# Patient Record
Sex: MALE | Race: WHITE | NOT HISPANIC OR LATINO
[De-identification: names, ages, dates, MRNs, and addresses within clinical notes are randomized per-mention and may not be internally consistent; named-entity substitution may affect disease eponyms.]

---

## 2021-09-05 ENCOUNTER — FORM ENCOUNTER (OUTPATIENT)
Age: 51
End: 2021-09-05

## 2021-09-08 ENCOUNTER — FORM ENCOUNTER (OUTPATIENT)
Age: 51
End: 2021-09-08

## 2021-09-30 ENCOUNTER — NON-APPOINTMENT (OUTPATIENT)
Age: 51
End: 2021-09-30

## 2021-09-30 ENCOUNTER — APPOINTMENT (OUTPATIENT)
Dept: ORTHOPEDIC SURGERY | Facility: CLINIC | Age: 51
End: 2021-09-30
Payer: COMMERCIAL

## 2021-09-30 VITALS
OXYGEN SATURATION: 95 % | HEIGHT: 75 IN | HEART RATE: 108 BPM | SYSTOLIC BLOOD PRESSURE: 133 MMHG | BODY MASS INDEX: 29.84 KG/M2 | WEIGHT: 240 LBS | DIASTOLIC BLOOD PRESSURE: 87 MMHG

## 2021-09-30 DIAGNOSIS — Z78.9 OTHER SPECIFIED HEALTH STATUS: ICD-10-CM

## 2021-09-30 DIAGNOSIS — Z82.49 FAMILY HISTORY OF ISCHEMIC HEART DISEASE AND OTHER DISEASES OF THE CIRCULATORY SYSTEM: ICD-10-CM

## 2021-09-30 DIAGNOSIS — Z87.09 PERSONAL HISTORY OF OTHER DISEASES OF THE RESPIRATORY SYSTEM: ICD-10-CM

## 2021-09-30 DIAGNOSIS — Z86.39 PERSONAL HISTORY OF OTHER ENDOCRINE, NUTRITIONAL AND METABOLIC DISEASE: ICD-10-CM

## 2021-09-30 DIAGNOSIS — Z86.79 PERSONAL HISTORY OF OTHER DISEASES OF THE CIRCULATORY SYSTEM: ICD-10-CM

## 2021-09-30 DIAGNOSIS — M51.26 OTHER INTERVERTEBRAL DISC DISPLACEMENT, LUMBAR REGION: ICD-10-CM

## 2021-09-30 DIAGNOSIS — Z72.3 LACK OF PHYSICAL EXERCISE: ICD-10-CM

## 2021-09-30 DIAGNOSIS — Z80.9 FAMILY HISTORY OF MALIGNANT NEOPLASM, UNSPECIFIED: ICD-10-CM

## 2021-09-30 DIAGNOSIS — Z56.0 UNEMPLOYMENT, UNSPECIFIED: ICD-10-CM

## 2021-09-30 PROBLEM — Z00.00 ENCOUNTER FOR PREVENTIVE HEALTH EXAMINATION: Status: ACTIVE | Noted: 2021-09-30

## 2021-09-30 PROCEDURE — 99243 OFF/OP CNSLTJ NEW/EST LOW 30: CPT

## 2021-09-30 SDOH — ECONOMIC STABILITY - INCOME SECURITY: UNEMPLOYMENT, UNSPECIFIED: Z56.0

## 2021-10-10 NOTE — DATA REVIEWED
[Imaging Present] : Present [de-identified] : X-rays reviewed from September 6, 2020 one of the left hand shows 3 small lesions in the ulnar cortex of the third proximal phalanx.  These are bubbly in appearance no obvious soft tissue mass.  There is a sclerotic border.  They do not appear overly aggressive.

## 2021-10-10 NOTE — HISTORY OF PRESENT ILLNESS
[FreeTextEntry1] : This 50-year-old who comes in after having an injury on September 6 falling and having some right elbow and left thumb pain.  His thumb has gotten better and he is improving otherwise.  He saw another doctor who took an x-ray of his hand showing a lesion in his middle proximal phalanx of his nondominant left hand.  He is here for evaluation of this.  He never had pain in this area.  He complains of minimal tenderness which he thinks is mostly from the injury.  He is otherwise intact. [Stable] : stable

## 2021-10-10 NOTE — CONSULT LETTER
[Dear  ___] : Dear  [unfilled], [FreeTextEntry2] : Tato Mehta MD\par [address] [place] [address], \par [address]\par [place] [id number] [FreeTextEntry1] : \par After evaluating your patient and reviewing the studies presented we have come to a mutually agreeable plan. \par \par Please see my note below.\par \par Should you have further questions, please feel free to call and discuss the care of your patient.\par \par Thank you for the confidence of your referral.\par \par Sincerely, \par \par Lester Russell MD \par Chief, Musculoskeletal Oncology \par [address] [facility], [address], \[place] [address] \par [id number]\par [phone]

## 2021-10-10 NOTE — REVIEW OF SYSTEMS
[Recent Wt Gain ___ (lbs)] : no recent weight gain [Joint Pain] : joint pain [Joint Swelling] : no joint swelling [Nl] : Hematologic/Lymphatic

## 2021-10-10 NOTE — DISCUSSION/SUMMARY
[All Questions Answered] : Patient (and family) had all questions answered to an agreeable level of satisfaction [Interested in Proceeding] : Patient (and family) expressed understanding and interest in proceeding with the plan as outlined [de-identified] : This looks like benign-appearing incidentally found lesion possibly enchondroma or other cortical-based lesion.  My recommendation is to follow this with an x-ray in 3 or 4 months.  As this is not getting bigger and does not cause much symptoms I would treat this as an asymptomatic incidental finding.  Follow-up for x-ray.  Free to do all activity.\par \par If imaging was ordered, the patient was told to make an appointment to review findings right after all imaging is completed.\par \par We discussed risks, benefits and alternatives. Rationale of care was reviewed and all questions were answered. Patient (and family) had all questions answered to her degree of the level of satisfaction. Patient (and family) expressed understanding and interest in proceeding with the plan as outlined.\par \par \par \par \par This note was done with a voice recognition transcription software and any typos are related to this rather than medical error. Surgical risks reviewed. Patient (and family) had all questions answered to an agreeable level of satisfaction. Patient (and family) expressed understanding and interest in proceeding with the plan as outlined.  \par

## 2021-10-10 NOTE — PHYSICAL EXAM
[FreeTextEntry1] : On exam the patient stands in good balance.  He is able to move around appropriately.  Is full range of motion of all the digits of his finger.  He has mild tenderness to palpation over the area.  He said that it is always been a little swollen but has never caused problems.  He has no Tinel's thrills or bruits.  He has no lymphadenopathy.  He is otherwise neurovascularly intact.  He has no skin lesions. [General Appearance - Well-Appearing] : Well appearing [Oriented To Time, Place, And Person] : Oriented to person, place, and time [General Appearance - Well Nourished] : well nourished [Impaired Insight] : Insight and judgment were intact [Affect] : The affect was normal. [Mood] : the mood was normal [Sclera] : the sclera and conjunctiva were normal [Neck Cervical Mass (___cm)] : no neck mass was observed [Heart Rate And Rhythm] : heart rate was normal and rhythm regular [Abdomen Soft] : Soft [] : No respiratory distress [Normal Station and Gait] : gait and station were normal [Tenderness] : tenderness [Masses] : no masses [Skin Changes - Describe changes:] : No skin changes noted [Full UE ROM unless otherwise noted:] : Full range of motion unless otherwise noted. [Normal] : Sensation intact to light touch. [UE Motor Strength Normal unless otherwise noted:] : 5/5 strength in bilateral upper extremities unless otherwise noted.

## 2022-01-06 ENCOUNTER — APPOINTMENT (OUTPATIENT)
Dept: ORTHOPEDIC SURGERY | Facility: CLINIC | Age: 52
End: 2022-01-06
Payer: COMMERCIAL

## 2022-01-06 DIAGNOSIS — M89.9 DISORDER OF BONE, UNSPECIFIED: ICD-10-CM

## 2022-01-06 PROCEDURE — 99213 OFFICE O/P EST LOW 20 MIN: CPT

## 2022-01-06 PROCEDURE — 73130 X-RAY EXAM OF HAND: CPT | Mod: LT

## 2022-01-12 NOTE — DISCUSSION/SUMMARY
[All Questions Answered] : Patient (and family) had all questions answered to an agreeable level of satisfaction [Interested in Proceeding] : Patient (and family) expressed understanding and interest in proceeding with the plan as outlined [de-identified] : Patient continues a stable lesion.  He may want to take care of at some point but currently he is asymptomatic at is okay watching it.  For this reason I will see him again in 3 months with repeat x-ray.  He is free to do all activity.\par \par If imaging was ordered, the patient was told to make an appointment to review findings right after all imaging is completed.\par \par We discussed risks, benefits and alternatives. Rationale of care was reviewed and all questions were answered. Patient (and family) had all questions answered to her degree of the level of satisfaction. Patient (and family) expressed understanding and interest in proceeding with the plan as outlined.\par \par \par \par \par This note was done with a voice recognition transcription software and any typos are related to this rather than medical error. Surgical risks reviewed. Patient (and family) had all questions answered to an agreeable level of satisfaction. Patient (and family) expressed understanding and interest in proceeding with the plan as outlined.  \par

## 2022-01-12 NOTE — DATA REVIEWED
[Imaging Present] : Present [de-identified] : X-rays multiple views of the left hand show the lesion in the middle proximal phalanx ulnarly.  This is unchanged from before.  There is still cortically-based thin expanded lesion consistent with cartilage vs. periosteal lesion.

## 2022-01-12 NOTE — PHYSICAL EXAM
[FreeTextEntry1] : On exam patient is able to move appropriately with no problems.  He does have a little bit of extension in the area of the proximal phalanx middle finger left hand.  He has full range of motion and no tenderness.  He is neurovascularly intact. [General Appearance - Well-Appearing] : Well appearing [General Appearance - Well Nourished] : well nourished [Normal Station and Gait] : gait and station were normal [Tenderness] : the appearance ~Z of the left arm ~Z was normal [Masses] : no masses [Skin Changes - Describe changes:] : No skin changes noted [Full UE ROM unless otherwise noted:] : Full range of motion unless otherwise noted. [UE Motor Strength Normal unless otherwise noted:] : 5/5 strength in bilateral upper extremities unless otherwise noted. [Normal] : Sensation intact to light touch.

## 2022-04-25 ENCOUNTER — APPOINTMENT (OUTPATIENT)
Dept: ORTHOPEDIC SURGERY | Facility: CLINIC | Age: 52
End: 2022-04-25
Payer: COMMERCIAL

## 2022-04-25 VITALS — HEIGHT: 75 IN | WEIGHT: 240 LBS | BODY MASS INDEX: 29.84 KG/M2

## 2022-04-25 PROCEDURE — 99204 OFFICE O/P NEW MOD 45 MIN: CPT

## 2022-04-25 PROCEDURE — 72050 X-RAY EXAM NECK SPINE 4/5VWS: CPT

## 2022-04-25 PROCEDURE — 99214 OFFICE O/P EST MOD 30 MIN: CPT

## 2022-04-25 RX ORDER — UBIDECARENONE 200 MG
500 CAPSULE ORAL
Qty: 30 | Refills: 0 | Status: ACTIVE | COMMUNITY
Start: 2022-01-13

## 2022-04-25 RX ORDER — CYCLOBENZAPRINE HYDROCHLORIDE 10 MG/1
10 TABLET, FILM COATED ORAL
Qty: 30 | Refills: 0 | Status: ACTIVE | COMMUNITY
Start: 2022-04-21

## 2022-04-25 RX ORDER — ZINC SULFATE 50(220)MG
220 (50 ZN) CAPSULE ORAL
Qty: 30 | Refills: 0 | Status: ACTIVE | COMMUNITY
Start: 2022-01-13

## 2022-04-25 RX ORDER — VERAPAMIL HYDROCHLORIDE 180 MG/1
180 CAPSULE, DELAYED RELEASE PELLETS ORAL
Qty: 30 | Refills: 0 | Status: ACTIVE | COMMUNITY
Start: 2022-01-11

## 2022-04-25 RX ORDER — PREDNISONE 10 MG/1
10 TABLET ORAL
Qty: 50 | Refills: 0 | Status: ACTIVE | COMMUNITY
Start: 2022-01-11

## 2022-04-25 RX ORDER — AZITHROMYCIN 250 MG/1
250 TABLET, FILM COATED ORAL
Qty: 20 | Refills: 0 | Status: ACTIVE | COMMUNITY
Start: 2022-01-11

## 2022-04-25 RX ORDER — LIDOCAINE HCL 4 %
250 MCG CREAM (GRAM) TOPICAL
Qty: 30 | Refills: 0 | Status: ACTIVE | COMMUNITY
Start: 2022-01-13

## 2022-04-25 RX ORDER — FLUTICASONE PROPIONATE AND SALMETEROL 50; 250 UG/1; UG/1
250-50 POWDER RESPIRATORY (INHALATION)
Qty: 60 | Refills: 0 | Status: ACTIVE | COMMUNITY
Start: 2022-01-20

## 2022-04-25 RX ORDER — ASPIRIN 325 MG
81 TABLET ORAL
Qty: 30 | Refills: 0 | Status: ACTIVE | COMMUNITY
Start: 2022-01-13

## 2022-04-25 NOTE — PHYSICAL EXAM
[NL (45)] : right lateral flexion 45 degrees [Outside films reviewed] : Outside films reviewed [de-identified] : Constitutional:\par -  General Appearance: \par Unremarkable\par Body Habitus\par Well Developed \par Well Nourished\par Body Habitus\par No Deformities\par Well Groomed\par \par Ability To communicate:\par Normal\par \par Neurologic: \par Global sensation is intact to upper and lower extremities.  See examination of Neck and/or Spine for exceptions.\par Orientation to Time, Place and Person is: Normal\par Mood And Affect is Normal\par \par Skin:\par -  Head/Face, Right Upper/Lower Extremity, Left Upper/Lower Extremity: Normal\par See Examination of Neck and/or Spine for exceptions\par \par Cardiovascular:\par Peripheral Cardiovascular System is Normal\par \par Palpation of Lymph Nodes:\par Normal Palpation of lymph nodes in: Axilla, Cervical, Inguinal\par Abnormal Palpation of lymph nodes in: None [] : no rhomboid tenderness [de-identified] : Neurological Testing for the cervical spine is as follows:\par - Intact sensation at this time, reports intermittent C7 paresthesias\par - Normal deep tendon reflexes bilateral upper extremities\par - Negative Spurling Test\par - Negative Plata Reflex\par - Deep Tendon Reflexes LEFT: Biceps (2+) | Triceps (2+) | Brachioradialis (2+)\par - Deep Tendon Reflexes RIGHT: Biceps (2+) | Triceps (2+) | Brachioradialis (2+) [FreeTextEntry1] : Cervical Spine X-Ray (04/25/22) - IN-HOUSE [facility]: \par On my interpretation of the images\par 1. C6-7 Mild to moderate DDD with small anterior osteophyte [de-identified] : left lateral rotation 60 degrees [TWNoteComboBox6] : right lateral rotation 60 degrees [TWNoteComboBox7] : False [de-identified] : False

## 2022-04-25 NOTE — HISTORY OF PRESENT ILLNESS
[Gradual] : gradual [6] : 6 [4] : 4 [Dull/Aching] : dull/aching [Radiating] : radiating [Sharp] : sharp [Stabbing] : stabbing [Throbbing] : throbbing [Frequent] : frequent [Sleep] : sleep [Bending forward] : bending forward [Lying in bed] : lying in bed [Coughing] : coughing [Full time] : Work status: full time [de-identified] : 52 y/o male presents for his initial consultation. Patient reports a longstanding history of neck pain. Patient reports a recent return of neck pain and radicular arm pain. Patient reports that in the remote past, he was evaluated at the ER for this issue, he was indicated for surgery as he had a strength deficit, however he decided not to proceed with it. Patient c/o cervical spine pain radiating into his right shoulder blade, triceps, forearm and into his pinky and ring finger. Patient reports a 3 week  history of this pain. Patient has been taking ibuprofen which he finds mildly helpful. \par \par Working Status: Not Working [] : no [FreeTextEntry1] : C-spine  [FreeTextEntry5] : Patient has an on going pain on the c-spine that’s is radiating  [FreeTextEntry6] : Spasms [FreeTextEntry7] : T-spine, Left arm/fingers  [FreeTextEntry9] : trigger point injection [de-identified] : 4/21/22 [de-identified] : Primary care

## 2022-04-25 NOTE — ASSESSMENT
[FreeTextEntry1] : 52 y/o male with cervical disc degeneration C6/7, only intermittent cervical radiculopathy on R.  No neurologic deficits on today's exam. Patient was provided with a referral for cervical physical therapy to work on stretching, strengthening and range of motion. Patient was provided with a cervical home exercise program. I would like to follow up with the patient in 4-6 weeks to assess his response to conservative care. Consideration for MRI c-spine if not improving.\par \par Prior to appointment and during encounter with patient extensive medical records were reviewed including but not limited to, hospital records, out patient records, imaging results, and lab data. During this appointment the patient was examined, diagnoses were discussed and explained in a face to face manner. In addition extensive time was spent reviewing aforementioned diagnostic studies. Counseling including abnormal image results, differential diagnoses, treatment options, risk and benefits, lifestyle changes, current condition, and current medications was performed. Patient's comments, questions, and concerns were address and patient verbalized understanding. Based on this patient's presentation at our office, which is an orthopedic spine surgeon's office, this patient inherently / intrinsically has a risk, however minute, of developing issues such as Cauda equina syndrome, bowel and bladder changes, or progression of motor or neurological deficits such as paralysis which may be permanent.\par \par I, Nik Romero, attest that this documentation has been prepared under the direction and in the presence of provider Wesley Fu MD.\par

## 2022-05-23 ENCOUNTER — APPOINTMENT (OUTPATIENT)
Dept: ORTHOPEDIC SURGERY | Facility: CLINIC | Age: 52
End: 2022-05-23
Payer: COMMERCIAL

## 2022-05-23 VITALS — HEIGHT: 75 IN | BODY MASS INDEX: 29.84 KG/M2 | WEIGHT: 240 LBS

## 2022-05-23 DIAGNOSIS — M54.12 RADICULOPATHY, CERVICAL REGION: ICD-10-CM

## 2022-05-23 PROCEDURE — 99214 OFFICE O/P EST MOD 30 MIN: CPT

## 2022-05-23 NOTE — REVIEW OF SYSTEMS
[Joint Pain] : joint pain [Joint Stiffness] : joint stiffness [Negative] : Heme/Lymph [FreeTextEntry9] : cervical spine

## 2022-05-23 NOTE — PHYSICAL EXAM
[de-identified] : Constitutional:\par -  General Appearance: \par Unremarkable\par Body Habitus\par Well Developed \par Well Nourished\par Body Habitus\par No Deformities\par Well Groomed\par \par Ability To communicate:\par Normal\par \par Neurologic: \par Global sensation is intact to upper and lower extremities.  See examination of Neck and/or Spine for exceptions.\par Orientation to Time, Place and Person is: Normal\par Mood And Affect is Normal\par \par Skin:\par -  Head/Face, Right Upper/Lower Extremity, Left Upper/Lower Extremity: Normal\par See Examination of Neck and/or Spine for exceptions\par \par Cardiovascular:\par Peripheral Cardiovascular System is Normal\par \par Palpation of Lymph Nodes:\par Normal Palpation of lymph nodes in: Axilla, Cervical, Inguinal\par Abnormal Palpation of lymph nodes in: None  [] : no rhomboid tenderness [de-identified] : Neurological Testing for the cervical spine is as follows:\par - Light Touch is intact throughout both upper extremities\par - C/o intermittent C7 paresthesias, but light touch is intact \par - Normal deep tendon reflexes bilateral upper extremities\par - Negative Spurling Test\par - Negative Plata Reflex\par - Deep Tendon Reflexes LEFT: Biceps (2+) | Triceps (2+) | Brachioradialis (2+)\par - Deep Tendon Reflexes RIGHT: Biceps (2+) | Triceps (2+) | Brachioradialis (2+)  [TWNoteComboBox7] : forward flexion 20 degrees [de-identified] : extension 20 degrees [de-identified] : left lateral rotation 60 degrees [TWNoteComboBox6] : right lateral rotation 60 degrees

## 2022-05-23 NOTE — HISTORY OF PRESENT ILLNESS
[6] : 6 [de-identified] : 52 y/o male presents for a follow up evaluation of cervical radiculopathy.  Patient reports that he continues to experience neck pain. Patient rates his pain as a constant 6/10. Patient has been following physician directed exercises which he did find helpful in reducing his axial neck pains. Patient reports a reduction in his left upper extremity pain. Patient has been taking NSAIDs intermittently for severe pain, despite having on going GI issues.

## 2022-05-23 NOTE — ASSESSMENT
[FreeTextEntry1] : 50 y/o male with continued cervical radiculopathy, with mild improvements using home exercises program and intermittent NSAIDs (despite hx of gastric problems) I am requesting a cervical spine non-contrast MRI to evaluate for disc herniation. I would like to follow up with the patient in 2-3 weeks to re-evaluate and review the results of his MRI. \par \par Prior to appointment and during encounter with patient extensive medical records were reviewed including but not limited to, hospital records, out patient records, imaging results, and lab data. During this appointment the patient was examined, diagnoses were discussed and explained in a face to face manner. In addition extensive time was spent reviewing aforementioned diagnostic studies. Counseling including abnormal image results, differential diagnoses, treatment options, risk and benefits, lifestyle changes, current condition, and current medications was performed. Patient's comments, questions, and concerns were address and patient verbalized understanding. Based on this patient's presentation at our office, which is an orthopedic spine surgeon's office, this patient inherently / intrinsically has a risk, however minute, of developing issues such as Cauda equina syndrome, bowel and bladder changes, or progression of motor or neurological deficits such as paralysis which may be permanent.\par \par I, Nik Romero, attest that this documentation has been prepared under the direction and in the presence of provider Wesley Fu MD.

## 2022-06-02 ENCOUNTER — APPOINTMENT (OUTPATIENT)
Dept: ORTHOPEDIC SURGERY | Facility: CLINIC | Age: 52
End: 2022-06-02

## 2022-06-13 ENCOUNTER — APPOINTMENT (OUTPATIENT)
Dept: ORTHOPEDIC SURGERY | Facility: CLINIC | Age: 52
End: 2022-06-13

## 2023-02-11 ENCOUNTER — OFFICE (OUTPATIENT)
Dept: URBAN - METROPOLITAN AREA CLINIC 103 | Facility: CLINIC | Age: 53
Setting detail: OPHTHALMOLOGY
End: 2023-02-11
Payer: COMMERCIAL

## 2023-02-11 DIAGNOSIS — G51.4: ICD-10-CM

## 2023-02-11 DIAGNOSIS — H16.222: ICD-10-CM

## 2023-02-11 PROCEDURE — 92012 INTRM OPH EXAM EST PATIENT: CPT | Performed by: OPHTHALMOLOGY

## 2023-02-11 ASSESSMENT — AXIALLENGTH_DERIVED: OS_AL: 23.0204

## 2023-02-11 ASSESSMENT — REFRACTION_AUTOREFRACTION
OS_AXIS: 020
OD_CYLINDER: -0.25
OS_SPHERE: -0.25
OD_AXIS: 016
OD_SPHERE: PLANO
OS_CYLINDER: -0.50

## 2023-02-11 ASSESSMENT — REFRACTION_MANIFEST
OS_ADD: +1.50
OS_VA1: 20/20
OD_VA1: 20/20
OD_ADD: +1.50
OS_SPHERE: -0.50
OD_SPHERE: PLANO

## 2023-02-11 ASSESSMENT — VISUAL ACUITY
OS_BCVA: 20/20
OD_BCVA: 20/20

## 2023-02-11 ASSESSMENT — KERATOMETRY
OD_AXISANGLE_DEGREES: 112
OD_K1POWER_DIOPTERS: 45.50
OS_K1POWER_DIOPTERS: 45.50
OS_K2POWER_DIOPTERS: 45.75
OS_AXISANGLE_DEGREES: 106
OD_K2POWER_DIOPTERS: 46.00

## 2023-02-11 ASSESSMENT — SUPERFICIAL PUNCTATE KERATITIS (SPK): OS_SPK: T

## 2023-02-11 ASSESSMENT — SPHEQUIV_DERIVED: OS_SPHEQUIV: -0.5

## 2023-02-11 ASSESSMENT — CONFRONTATIONAL VISUAL FIELD TEST (CVF)
OD_FINDINGS: FULL
OS_FINDINGS: FULL

## 2023-03-15 ENCOUNTER — OFFICE (OUTPATIENT)
Dept: URBAN - METROPOLITAN AREA CLINIC 62 | Facility: CLINIC | Age: 53
Setting detail: OPHTHALMOLOGY
End: 2023-03-15
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
## 2023-03-15 PROCEDURE — NO SHOW FE NO SHOW FEE: Performed by: OPHTHALMOLOGY

## 2024-04-09 ENCOUNTER — OFFICE (OUTPATIENT)
Dept: URBAN - METROPOLITAN AREA CLINIC 103 | Facility: CLINIC | Age: 54
Setting detail: OPHTHALMOLOGY
End: 2024-04-09
Payer: COMMERCIAL

## 2024-04-09 DIAGNOSIS — H16.222: ICD-10-CM

## 2024-04-09 PROCEDURE — 92250 FUNDUS PHOTOGRAPHY W/I&R: CPT | Performed by: OPHTHALMOLOGY

## 2024-04-09 PROCEDURE — 92014 COMPRE OPH EXAM EST PT 1/>: CPT | Performed by: OPHTHALMOLOGY

## 2024-05-25 ENCOUNTER — OFFICE (OUTPATIENT)
Dept: URBAN - METROPOLITAN AREA CLINIC 103 | Facility: CLINIC | Age: 54
Setting detail: OPHTHALMOLOGY
End: 2024-05-25
Payer: COMMERCIAL

## 2024-05-25 DIAGNOSIS — H16.222: ICD-10-CM

## 2024-05-25 DIAGNOSIS — H52.7: ICD-10-CM

## 2024-05-25 DIAGNOSIS — H25.13: ICD-10-CM

## 2024-05-25 PROCEDURE — 92015 DETERMINE REFRACTIVE STATE: CPT | Performed by: OPHTHALMOLOGY

## 2024-05-25 PROCEDURE — 92014 COMPRE OPH EXAM EST PT 1/>: CPT | Performed by: OPHTHALMOLOGY

## 2024-05-25 ASSESSMENT — CONFRONTATIONAL VISUAL FIELD TEST (CVF)
OS_FINDINGS: FULL
OD_FINDINGS: FULL

## 2024-07-26 ENCOUNTER — OFFICE (OUTPATIENT)
Dept: URBAN - METROPOLITAN AREA CLINIC 103 | Facility: CLINIC | Age: 54
Setting detail: OPHTHALMOLOGY
End: 2024-07-26
Payer: COMMERCIAL

## 2024-07-26 DIAGNOSIS — T15.01XA: ICD-10-CM

## 2024-07-26 PROCEDURE — 99213 OFFICE O/P EST LOW 20 MIN: CPT | Performed by: OPHTHALMOLOGY

## 2024-07-26 ASSESSMENT — CONFRONTATIONAL VISUAL FIELD TEST (CVF)
OD_FINDINGS: FULL
OS_FINDINGS: FULL

## 2024-08-06 ENCOUNTER — OFFICE (OUTPATIENT)
Dept: URBAN - METROPOLITAN AREA CLINIC 103 | Facility: CLINIC | Age: 54
Setting detail: OPHTHALMOLOGY
End: 2024-08-06
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
